# Patient Record
Sex: FEMALE | Race: WHITE | NOT HISPANIC OR LATINO | URBAN - METROPOLITAN AREA
[De-identification: names, ages, dates, MRNs, and addresses within clinical notes are randomized per-mention and may not be internally consistent; named-entity substitution may affect disease eponyms.]

---

## 2018-05-28 ENCOUNTER — EMERGENCY (EMERGENCY)
Facility: HOSPITAL | Age: 28
LOS: 1 days | Discharge: HOME | End: 2018-05-28
Admitting: EMERGENCY MEDICINE
Payer: MEDICAID

## 2018-05-28 PROCEDURE — 99283 EMERGENCY DEPT VISIT LOW MDM: CPT

## 2018-06-20 ENCOUNTER — EMERGENCY (EMERGENCY)
Facility: HOSPITAL | Age: 28
LOS: 1 days | Discharge: HOME | End: 2018-06-20
Attending: EMERGENCY MEDICINE
Payer: MEDICAID

## 2018-06-20 VITALS
RESPIRATION RATE: 18 BRPM | SYSTOLIC BLOOD PRESSURE: 116 MMHG | TEMPERATURE: 98 F | WEIGHT: 139.99 LBS | DIASTOLIC BLOOD PRESSURE: 77 MMHG | HEART RATE: 108 BPM | OXYGEN SATURATION: 96 % | HEIGHT: 65 IN

## 2018-06-20 PROCEDURE — 99283 EMERGENCY DEPT VISIT LOW MDM: CPT

## 2018-06-20 RX ORDER — DEXTROAMPHETAMINE SACCHARATE, AMPHETAMINE ASPARTATE, DEXTROAMPHETAMINE SULFATE AND AMPHETAMINE SULFATE 1.875; 1.875; 1.875; 1.875 MG/1; MG/1; MG/1; MG/1
1 TABLET ORAL
Qty: 6 | Refills: 0 | OUTPATIENT
Start: 2018-06-20 | End: 2018-06-23

## 2018-06-20 RX ORDER — ALPRAZOLAM 0.25 MG
1 TABLET ORAL
Qty: 6 | Refills: 0 | OUTPATIENT
Start: 2018-06-20

## 2018-06-20 NOTE — ED ADULT NURSE NOTE - PMH
ADHD (attention deficit hyperactivity disorder)    ADHD (attention deficit hyperactivity disorder)    Anxiety    Anxiety disorder

## 2018-06-20 NOTE — ED ADULT NURSE NOTE - CHPI ED SYMPTOMS NEG
no pain/no fever/no nausea/no tingling/no dizziness/no weakness/no decreased eating/drinking/no chills/no numbness/no vomiting

## 2018-06-20 NOTE — ED PROVIDER NOTE - PLAN OF CARE
Take your medications as directed  follow up with the medicine clinic as well as [facility]  [phone]  REturn to the ER for any concerns

## 2018-06-20 NOTE — ED PROVIDER NOTE - MEDICAL DECISION MAKING DETAILS
Attending note-pacing requesting refill prescription for Xanax for oral and she is not able to get prescription filled by anyone. Patient states no one takes her insurance. Patient will also be referred to internal medicine clinic as well as psychiatry clinic.

## 2018-06-20 NOTE — ED PROVIDER NOTE - CARE PLAN
Principal Discharge DX:	Anxiety disorder, unspecified type Principal Discharge DX:	Anxiety disorder, unspecified type  Assessment and plan of treatment:	Take your medications as directed  follow up with the medicine clinic as well as [facility]  [phone]  REturn to the ER for any concerns

## 2018-06-20 NOTE — ED ADULT NURSE NOTE - OBJECTIVE STATEMENT
Female 28 years old came in for medication refill for Xanax 2 mg twice daily and Adderal 20 mg BID. Denies any pain and discomfort. Seen by MD.

## 2018-10-10 ENCOUNTER — TRANSCRIPTION ENCOUNTER (OUTPATIENT)
Age: 28
End: 2018-10-10

## 2019-03-19 ENCOUNTER — APPOINTMENT (OUTPATIENT)
Dept: DERMATOLOGY | Facility: CLINIC | Age: 29
End: 2019-03-19

## 2019-04-29 ENCOUNTER — TRANSCRIPTION ENCOUNTER (OUTPATIENT)
Age: 29
End: 2019-04-29

## 2019-06-07 ENCOUNTER — TRANSCRIPTION ENCOUNTER (OUTPATIENT)
Age: 29
End: 2019-06-07

## 2020-03-19 ENCOUNTER — TRANSCRIPTION ENCOUNTER (OUTPATIENT)
Age: 30
End: 2020-03-19

## 2020-06-17 ENCOUNTER — APPOINTMENT (OUTPATIENT)
Dept: UROLOGY | Facility: CLINIC | Age: 30
End: 2020-06-17

## 2020-10-22 ENCOUNTER — APPOINTMENT (OUTPATIENT)
Dept: UROLOGY | Facility: CLINIC | Age: 30
End: 2020-10-22

## 2021-06-27 ENCOUNTER — TRANSCRIPTION ENCOUNTER (OUTPATIENT)
Age: 31
End: 2021-06-27

## 2022-05-03 ENCOUNTER — APPOINTMENT (OUTPATIENT)
Dept: AFTER HOURS CARE | Facility: EMERGENCY ROOM | Age: 32
End: 2022-05-03
Payer: MEDICAID

## 2022-05-04 DIAGNOSIS — B00.1 HERPESVIRAL VESICULAR DERMATITIS: ICD-10-CM

## 2022-05-04 PROCEDURE — 99203 OFFICE O/P NEW LOW 30 MIN: CPT | Mod: 95

## 2022-05-04 RX ORDER — VALACYCLOVIR 1 G/1
1 TABLET, FILM COATED ORAL TWICE DAILY
Qty: 16 | Refills: 0 | Status: ACTIVE | COMMUNITY
Start: 2022-05-04 | End: 1900-01-01

## 2022-05-04 NOTE — HISTORY OF PRESENT ILLNESS
[Home] : at home, [unfilled] , at the time of the visit. [Other Location: e.g. Home (Enter Location, City,State)___] : at [unfilled] [Verbal consent obtained from patient] : the patient, [unfilled] [FreeTextEntry8] : 33 yo woman with no PMHx presents with complaint of developing a cold sore on her lip.  No fever, chills, headache, weakness, or eye irritation.   Patient has taken valacyclovir in the past for this and it has helped.  She has no history of kidney disease.

## 2022-05-04 NOTE — ASSESSMENT
[FreeTextEntry1] : 31 yo woman with no PMHx presents with complaint of developing a cold sore on her lip.  No fever, chills, headache, weakness, or eye irritation.   Patient has taken valacyclovir in the past for this and it has helped.  She has no history of kidney disease.  \par \par Impression:\par \par Herpes labialis\par \par

## 2022-05-04 NOTE — PHYSICAL EXAM
[No Acute Distress] : no acute distress [Well Nourished] : well nourished [Well Developed] : well developed [Well-Appearing] : well-appearing [Normal Sclera/Conjunctiva] : normal sclera/conjunctiva [Normal Outer Ear/Nose] : the outer ears and nose were normal in appearance [No Respiratory Distress] : no respiratory distress  [No Accessory Muscle Use] : no accessory muscle use [Speech Grossly Normal] : speech grossly normal [Normal Affect] : the affect was normal [Alert and Oriented x3] : oriented to person, place, and time [Normal Insight/Judgement] : insight and judgment were intact [de-identified] : Mild vesicular rash noted on left upper lip

## 2023-01-04 ENCOUNTER — APPOINTMENT (OUTPATIENT)
Dept: AFTER HOURS CARE | Facility: EMERGENCY ROOM | Age: 33
End: 2023-01-04
Payer: SELF-PAY

## 2023-01-04 DIAGNOSIS — N39.0 URINARY TRACT INFECTION, SITE NOT SPECIFIED: ICD-10-CM

## 2023-01-04 PROCEDURE — 99214 OFFICE O/P EST MOD 30 MIN: CPT | Mod: 95

## 2023-01-04 RX ORDER — NITROFURANTOIN (MONOHYDRATE/MACROCRYSTALS) 25; 75 MG/1; MG/1
100 CAPSULE ORAL
Qty: 10 | Refills: 0 | Status: ACTIVE | COMMUNITY
Start: 2023-01-04 | End: 1900-01-01

## 2023-01-04 NOTE — HISTORY OF PRESENT ILLNESS
[Home] : at home, [unfilled] , at the time of the visit. [Other Location: e.g. Home (Enter Location, City,State)___] : at [unfilled] [Verbal consent obtained from patient] : the patient, [unfilled] [FreeTextEntry8] : 32y F no pmhx on no meds now p/w 1-2d urinary urgency and frequency. This feels similar to prior UTIs, last of which\par was ~1yr ago. No hematuria, back pain, flank pain, fever, chills, vag bleed or dc. NKDA.

## 2023-01-04 NOTE — PLAN
[With new medications prescribed] : Treat in place: with new medications prescribed [FreeTextEntry1] : rx abx\par anticipatory guidance\par ED precautions\par pmd vs GYN f/u as needed, especially if no improvement after abx

## 2023-01-04 NOTE — ASSESSMENT
[FreeTextEntry1] : most c/w acute uncomplicated lower UTI w/o e/o pyelo or STI in this otherwise healthy, afebrile, well-appearing woman

## 2023-03-21 ENCOUNTER — NON-APPOINTMENT (OUTPATIENT)
Age: 33
End: 2023-03-21

## 2023-03-22 ENCOUNTER — APPOINTMENT (OUTPATIENT)
Dept: AFTER HOURS CARE | Facility: EMERGENCY ROOM | Age: 33
End: 2023-03-22
Payer: MEDICAID

## 2023-03-22 PROCEDURE — 99214 OFFICE O/P EST MOD 30 MIN: CPT | Mod: 95

## 2023-03-22 PROCEDURE — 99204 OFFICE O/P NEW MOD 45 MIN: CPT | Mod: 95

## 2023-03-22 RX ORDER — NITROFURANTOIN (MONOHYDRATE/MACROCRYSTALS) 25; 75 MG/1; MG/1
100 CAPSULE ORAL
Qty: 10 | Refills: 0 | Status: ACTIVE | COMMUNITY
Start: 2023-03-22 | End: 1900-01-01

## 2023-03-22 NOTE — PLAN
[With new medications prescribed] : Treat in place: with new medications prescribed [FreeTextEntry1] : You were evaluated for urinary frequency during your Telehealth visit. Your symptoms may be due to a urinary tract  infection. A prescription for an antibiotic called Macrobid also known as nitrofurantoin has been sent to the pharmacy below. Please drink plenty of fluids. If your symptoms are worsening, you are developing abdominal pain, nausea or vomiting, fevers or abnormal vaginal discharge or if you have any concerns please seek medical attention.\par \par Duane Read\par [address]\[facility]

## 2023-03-22 NOTE — HISTORY OF PRESENT ILLNESS
[Home] : at home, [unfilled] , at the time of the visit. [Other Location: e.g. Home (Enter Location, City,State)___] : at [unfilled] [Verbal consent obtained from patient] : the patient, [unfilled] [FreeTextEntry8] : 33F p/w "UTI". 3-4 days of urgency. No abdominal pain. No fevers. LNMP: 3 weeks ago. Second UTI this year.\par PMH: None\par Meds: No\par Allergies: NKDA \par \par (DPX Teladoc)

## 2023-03-22 NOTE — PHYSICAL EXAM
[No Acute Distress] : no acute distress [Well-Appearing] : well-appearing [Speech Grossly Normal] : speech grossly normal

## 2023-03-22 NOTE — ASSESSMENT
[FreeTextEntry1] : S/s c/w uncomplicated UTI. Will treat with Macrobid. Advised patient to obtain urine cultures if she has another UTI this year to screen for drug resistance.

## 2023-08-09 NOTE — ED ADULT NURSE NOTE - NSSISCREENINGQ5_ED_A_ED
[FreeTextEntry3] : I, Tal Weaver MD, personally saw and evaluated the patient and developed the plan as documented above. I concur or have edited the note as appropriate.
No

## 2024-02-06 ENCOUNTER — APPOINTMENT (OUTPATIENT)
Dept: AFTER HOURS CARE | Facility: EMERGENCY ROOM | Age: 34
End: 2024-02-06

## 2024-05-12 ENCOUNTER — APPOINTMENT (OUTPATIENT)
Dept: AFTER HOURS CARE | Facility: EMERGENCY ROOM | Age: 34
End: 2024-05-12
Payer: MEDICAID

## 2024-05-12 DIAGNOSIS — S02.5XXA FRACTURE OF TOOTH (TRAUMATIC), INITIAL ENCOUNTER FOR CLOSED FRACTURE: ICD-10-CM

## 2024-05-12 PROCEDURE — 99203 OFFICE O/P NEW LOW 30 MIN: CPT

## 2024-05-12 PROCEDURE — 99213 OFFICE O/P EST LOW 20 MIN: CPT | Mod: 95

## 2024-05-12 RX ORDER — AMOXICILLIN 875 MG/1
875 TABLET, FILM COATED ORAL
Qty: 14 | Refills: 0 | Status: ACTIVE | COMMUNITY
Start: 2024-05-12 | End: 1900-01-01

## 2024-05-12 RX ORDER — SACCHAROMYCES BOULARDII 50 MG
250 CAPSULE ORAL TWICE DAILY
Qty: 20 | Refills: 0 | Status: ACTIVE | COMMUNITY
Start: 2024-05-12 | End: 1900-01-01

## 2024-05-12 NOTE — PLAN
[FreeTextEntry1] : Plan:  Amoxicillin 875 mg orally twice per day for 7 days. Florastor 250 mg orally twice per day for 10 days. Ibuprofen 600 mg and acetaminophen (Tylenol) 650-1000 mg orally every 6 hours together for pain. Any new or worsening, such as fever, increased swelling, trouble swallowing, or trouble breathing should all result in an immediate emergency department visit. Follow up with your dentist in the morning.

## 2024-05-12 NOTE — HISTORY OF PRESENT ILLNESS
[Home] : at home, [unfilled] , at the time of the visit. [Other Location: e.g. Home (Enter Location, City,State)___] : at [unfilled] [Verbal consent obtained from patient] : the patient, [unfilled] [FreeTextEntry8] : 35 yo woman with PMHx of ADHD and anxiety presents with complaint of fracture left lower molar tooth which has recently become ainful over the past few days.  Patient wants to start antibiotics to avoid complications.  No fever, chills, swelling, nausea, vomiting, drooling, shortness of breath or change in speech.

## 2024-05-12 NOTE — ASSESSMENT
[FreeTextEntry1] : 35 yo woman with PMHx of ADHD and anxiety presents with complaint of fracture left lower molar tooth which has recently become ainful over the past few days.  Patient wants to start antibiotics to avoid complications.  No fever, chills, swelling, nausea, vomiting, drooling, shortness of breath or change in speech.    Assessment:  Dentalgia

## 2024-05-12 NOTE — PHYSICAL EXAM
[Well Nourished] : well nourished [Well Developed] : well developed [Well-Appearing] : well-appearing [Normal Sclera/Conjunctiva] : normal sclera/conjunctiva [EOMI] : extraocular movements intact [Normal Outer Ear/Nose] : the outer ears and nose were normal in appearance [Normal Oropharynx] : the oropharynx was normal [Supple] : supple [No Accessory Muscle Use] : no accessory muscle use [No Respiratory Distress] : no respiratory distress  [No Rash] : no rash [Coordination Grossly Intact] : coordination grossly intact [No Focal Deficits] : no focal deficits [Speech Grossly Normal] : speech grossly normal [Normal Affect] : the affect was normal [Alert and Oriented x3] : oriented to person, place, and time [Normal Insight/Judgement] : insight and judgment were intact [de-identified] : mild painful distress [de-identified] : No abscess noted at site of fractured tooth at left lower posterior molar

## 2024-06-30 PROBLEM — K08.89 DENTALGIA: Status: ACTIVE | Noted: 2024-05-12

## 2024-07-08 ENCOUNTER — APPOINTMENT (OUTPATIENT)
Dept: AFTER HOURS CARE | Facility: EMERGENCY ROOM | Age: 34
End: 2024-07-08

## 2024-07-08 ENCOUNTER — APPOINTMENT (OUTPATIENT)
Dept: AFTER HOURS CARE | Facility: EMERGENCY ROOM | Age: 34
End: 2024-07-08
Payer: MEDICAID

## 2024-07-08 PROCEDURE — 99214 OFFICE O/P EST MOD 30 MIN: CPT

## 2024-07-08 RX ORDER — PENICILLIN V POTASSIUM 500 MG/1
500 TABLET, FILM COATED ORAL EVERY 6 HOURS
Qty: 28 | Refills: 0 | Status: ACTIVE | COMMUNITY
Start: 2024-07-08 | End: 1900-01-01

## 2024-07-08 RX ORDER — VALACYCLOVIR 1 G/1
1 TABLET, FILM COATED ORAL
Qty: 8 | Refills: 0 | Status: ACTIVE | COMMUNITY
Start: 2024-07-08 | End: 1900-01-01

## 2024-12-09 ENCOUNTER — APPOINTMENT (OUTPATIENT)
Dept: AFTER HOURS CARE | Facility: EMERGENCY ROOM | Age: 34
End: 2024-12-09
Payer: MEDICAID

## 2024-12-09 PROCEDURE — 99215 OFFICE O/P EST HI 40 MIN: CPT

## 2024-12-09 RX ORDER — VALACYCLOVIR 1 G/1
1 TABLET, FILM COATED ORAL
Qty: 4 | Refills: 1 | Status: ACTIVE | COMMUNITY
Start: 2024-12-09 | End: 1900-01-01

## 2025-01-24 ENCOUNTER — APPOINTMENT (OUTPATIENT)
Dept: AFTER HOURS CARE | Facility: EMERGENCY ROOM | Age: 35
End: 2025-01-24

## 2025-01-24 DIAGNOSIS — H10.13 ACUTE ATOPIC CONJUNCTIVITIS, BILATERAL: ICD-10-CM

## 2025-01-24 PROCEDURE — 99214 OFFICE O/P EST MOD 30 MIN: CPT | Mod: 95

## 2025-01-24 RX ORDER — OFLOXACIN 3 MG/ML
0.3 SOLUTION/ DROPS OPHTHALMIC 4 TIMES DAILY
Qty: 1 | Refills: 0 | Status: ACTIVE | COMMUNITY
Start: 2025-01-24 | End: 1900-01-01

## 2025-08-08 ENCOUNTER — EMERGENCY (EMERGENCY)
Facility: HOSPITAL | Age: 35
LOS: 1 days | End: 2025-08-08
Attending: STUDENT IN AN ORGANIZED HEALTH CARE EDUCATION/TRAINING PROGRAM | Admitting: STUDENT IN AN ORGANIZED HEALTH CARE EDUCATION/TRAINING PROGRAM
Payer: MEDICAID

## 2025-08-08 VITALS
SYSTOLIC BLOOD PRESSURE: 123 MMHG | HEART RATE: 114 BPM | RESPIRATION RATE: 16 BRPM | OXYGEN SATURATION: 97 % | WEIGHT: 149.91 LBS | HEIGHT: 64 IN | DIASTOLIC BLOOD PRESSURE: 67 MMHG | TEMPERATURE: 99 F

## 2025-08-08 LAB
APPEARANCE UR: CLEAR — SIGNIFICANT CHANGE UP
BILIRUB UR-MCNC: NEGATIVE — SIGNIFICANT CHANGE UP
COLOR SPEC: YELLOW — SIGNIFICANT CHANGE UP
DIFF PNL FLD: NEGATIVE — SIGNIFICANT CHANGE UP
FLUAV AG NPH QL: SIGNIFICANT CHANGE UP
FLUBV AG NPH QL: SIGNIFICANT CHANGE UP
GLUCOSE UR QL: NEGATIVE MG/DL — SIGNIFICANT CHANGE UP
HCG UR QL: NEGATIVE — SIGNIFICANT CHANGE UP
KETONES UR QL: 15 MG/DL
LEUKOCYTE ESTERASE UR-ACNC: ABNORMAL
NITRITE UR-MCNC: NEGATIVE — SIGNIFICANT CHANGE UP
PH UR: 5.5 — SIGNIFICANT CHANGE UP (ref 5–8)
PROT UR-MCNC: 30 MG/DL
RSV RNA NPH QL NAA+NON-PROBE: SIGNIFICANT CHANGE UP
SARS-COV-2 RNA SPEC QL NAA+PROBE: SIGNIFICANT CHANGE UP
SOURCE RESPIRATORY: SIGNIFICANT CHANGE UP
SP GR SPEC: >1.03 — HIGH (ref 1–1.03)
UROBILINOGEN FLD QL: 2 MG/DL (ref 0.2–1)

## 2025-08-08 PROCEDURE — 99284 EMERGENCY DEPT VISIT MOD MDM: CPT

## 2025-08-08 RX ORDER — ACETAMINOPHEN 500 MG/5ML
650 LIQUID (ML) ORAL ONCE
Refills: 0 | Status: COMPLETED | OUTPATIENT
Start: 2025-08-08 | End: 2025-08-08

## 2025-08-08 RX ORDER — ONDANSETRON HCL/PF 4 MG/2 ML
4 VIAL (ML) INJECTION ONCE
Refills: 0 | Status: COMPLETED | OUTPATIENT
Start: 2025-08-08 | End: 2025-08-08

## 2025-08-08 RX ADMIN — Medication 650 MILLIGRAM(S): at 22:42

## 2025-08-08 RX ADMIN — Medication 4 MILLIGRAM(S): at 22:42

## 2025-08-09 VITALS
SYSTOLIC BLOOD PRESSURE: 118 MMHG | DIASTOLIC BLOOD PRESSURE: 69 MMHG | RESPIRATION RATE: 18 BRPM | HEART RATE: 92 BPM | OXYGEN SATURATION: 100 % | TEMPERATURE: 98 F

## 2025-08-09 PROCEDURE — 99283 EMERGENCY DEPT VISIT LOW MDM: CPT

## 2025-08-09 PROCEDURE — 87186 SC STD MICRODIL/AGAR DIL: CPT

## 2025-08-09 PROCEDURE — 87077 CULTURE AEROBIC IDENTIFY: CPT

## 2025-08-09 PROCEDURE — 87086 URINE CULTURE/COLONY COUNT: CPT

## 2025-08-09 PROCEDURE — 81025 URINE PREGNANCY TEST: CPT

## 2025-08-09 PROCEDURE — 87637 SARSCOV2&INF A&B&RSV AMP PRB: CPT

## 2025-08-09 PROCEDURE — 81001 URINALYSIS AUTO W/SCOPE: CPT

## 2025-08-09 RX ORDER — NITROFURANTOIN MACROCRYSTAL 100 MG
1 CAPSULE ORAL
Qty: 14 | Refills: 0
Start: 2025-08-09 | End: 2025-08-15

## 2025-08-09 RX ORDER — NITROFURANTOIN MACROCRYSTAL 100 MG
100 CAPSULE ORAL ONCE
Refills: 0 | Status: COMPLETED | OUTPATIENT
Start: 2025-08-09 | End: 2025-08-09

## 2025-08-09 RX ORDER — IBUPROFEN 200 MG
600 TABLET ORAL ONCE
Refills: 0 | Status: COMPLETED | OUTPATIENT
Start: 2025-08-09 | End: 2025-08-09

## 2025-08-09 RX ADMIN — Medication 100 MILLIGRAM(S): at 00:39

## 2025-08-09 RX ADMIN — Medication 600 MILLIGRAM(S): at 00:39

## 2025-08-12 DIAGNOSIS — R00.0 TACHYCARDIA, UNSPECIFIED: ICD-10-CM

## 2025-08-12 DIAGNOSIS — R11.0 NAUSEA: ICD-10-CM

## 2025-08-12 DIAGNOSIS — Z91.048 OTHER NONMEDICINAL SUBSTANCE ALLERGY STATUS: ICD-10-CM

## 2025-08-12 DIAGNOSIS — R51.9 HEADACHE, UNSPECIFIED: ICD-10-CM

## 2025-08-12 DIAGNOSIS — N39.0 URINARY TRACT INFECTION, SITE NOT SPECIFIED: ICD-10-CM
